# Patient Record
Sex: MALE | URBAN - METROPOLITAN AREA
[De-identification: names, ages, dates, MRNs, and addresses within clinical notes are randomized per-mention and may not be internally consistent; named-entity substitution may affect disease eponyms.]

---

## 2017-10-23 ENCOUNTER — APPOINTMENT (RX ONLY)
Dept: URBAN - METROPOLITAN AREA CLINIC 348 | Facility: CLINIC | Age: 22
Setting detail: DERMATOLOGY
End: 2017-10-23

## 2017-10-23 DIAGNOSIS — L74.51 PRIMARY FOCAL HYPERHIDROSIS: ICD-10-CM | Status: STABLE

## 2017-10-23 PROBLEM — L74.513 PRIMARY FOCAL HYPERHIDROSIS, SOLES: Status: ACTIVE | Noted: 2017-10-23

## 2017-10-23 PROBLEM — L74.512 PRIMARY FOCAL HYPERHIDROSIS, PALMS: Status: ACTIVE | Noted: 2017-10-23

## 2017-10-23 PROBLEM — L74.510 PRIMARY FOCAL HYPERHIDROSIS, AXILLA: Status: ACTIVE | Noted: 2017-10-23

## 2017-10-23 PROCEDURE — 99213 OFFICE O/P EST LOW 20 MIN: CPT

## 2017-10-23 PROCEDURE — ? TREATMENT REGIMEN

## 2017-10-23 PROCEDURE — ? PRESCRIPTION

## 2017-10-23 PROCEDURE — ? COUNSELING

## 2017-10-23 RX ORDER — GLYCOPYRROLATE 1 MG/1
TABLET ORAL
Qty: 30 | Refills: 3 | Status: ERX

## 2017-10-23 RX ORDER — ALUMINUM CHLORIDE 20 %
SOLUTION, NON-ORAL TOPICAL
Qty: 1 | Refills: 3 | Status: ERX

## 2017-10-23 RX ORDER — GLYCOPYRROLATE 1 MG/1
TABLET ORAL
Qty: 30 | Refills: 2 | Status: CANCELLED
Stop reason: CLARIF

## 2017-10-23 ASSESSMENT — LOCATION DETAILED DESCRIPTION DERM
LOCATION DETAILED: LEFT RADIAL PALM
LOCATION DETAILED: RIGHT AXILLARY VAULT
LOCATION DETAILED: RIGHT MEDIAL PLANTAR MIDFOOT
LOCATION DETAILED: LEFT LATERAL PLANTAR MIDFOOT
LOCATION DETAILED: LEFT AXILLARY VAULT
LOCATION DETAILED: RIGHT RADIAL PALM

## 2017-10-23 ASSESSMENT — LOCATION ZONE DERM
LOCATION ZONE: HAND
LOCATION ZONE: FEET
LOCATION ZONE: AXILLAE

## 2017-10-23 ASSESSMENT — LOCATION SIMPLE DESCRIPTION DERM
LOCATION SIMPLE: RIGHT AXILLARY VAULT
LOCATION SIMPLE: LEFT PLANTAR SURFACE
LOCATION SIMPLE: RIGHT HAND
LOCATION SIMPLE: LEFT AXILLARY VAULT
LOCATION SIMPLE: RIGHT PLANTAR SURFACE
LOCATION SIMPLE: LEFT HAND

## 2017-10-23 NOTE — PROCEDURE: TREATMENT REGIMEN
Detail Level: Detailed
Continue Regimen: Hypercare- apply to affected areas every night\\nRobinul- take 1 tab every day

## 2019-09-28 ENCOUNTER — APPOINTMENT (OUTPATIENT)
Dept: GENERAL RADIOLOGY | Age: 24
End: 2019-09-28
Attending: EMERGENCY MEDICINE
Payer: COMMERCIAL

## 2019-09-28 ENCOUNTER — HOSPITAL ENCOUNTER (EMERGENCY)
Age: 24
Discharge: HOME | End: 2019-09-29
Attending: EMERGENCY MEDICINE | Admitting: EMERGENCY MEDICINE
Payer: COMMERCIAL

## 2019-09-28 DIAGNOSIS — T78.05XA: Primary | ICD-10-CM

## 2019-09-28 LAB
COMMENT, HOLDF: NORMAL
SAMPLES BEING HELD,HOLD: NORMAL

## 2019-09-28 PROCEDURE — 74011250636 HC RX REV CODE- 250/636: Performed by: EMERGENCY MEDICINE

## 2019-09-28 PROCEDURE — 94640 AIRWAY INHALATION TREATMENT: CPT

## 2019-09-28 PROCEDURE — 96374 THER/PROPH/DIAG INJ IV PUSH: CPT

## 2019-09-28 PROCEDURE — 99285 EMERGENCY DEPT VISIT HI MDM: CPT

## 2019-09-28 PROCEDURE — 74011000250 HC RX REV CODE- 250: Performed by: EMERGENCY MEDICINE

## 2019-09-28 PROCEDURE — 96375 TX/PRO/DX INJ NEW DRUG ADDON: CPT

## 2019-09-28 PROCEDURE — 71046 X-RAY EXAM CHEST 2 VIEWS: CPT

## 2019-09-28 RX ORDER — ALBUTEROL SULFATE 90 UG/1
AEROSOL, METERED RESPIRATORY (INHALATION)
COMMUNITY

## 2019-09-28 RX ADMIN — FAMOTIDINE 20 MG: 10 INJECTION, SOLUTION INTRAVENOUS at 23:45

## 2019-09-28 RX ADMIN — METHYLPREDNISOLONE SODIUM SUCCINATE 125 MG: 125 INJECTION, POWDER, FOR SOLUTION INTRAMUSCULAR; INTRAVENOUS at 23:43

## 2019-09-28 RX ADMIN — ALBUTEROL SULFATE 1 DOSE: 2.5 SOLUTION RESPIRATORY (INHALATION) at 23:37

## 2019-09-29 VITALS
HEIGHT: 70 IN | DIASTOLIC BLOOD PRESSURE: 76 MMHG | OXYGEN SATURATION: 97 % | RESPIRATION RATE: 16 BRPM | BODY MASS INDEX: 29.89 KG/M2 | WEIGHT: 208.78 LBS | SYSTOLIC BLOOD PRESSURE: 120 MMHG | HEART RATE: 80 BPM | TEMPERATURE: 98 F

## 2019-09-29 RX ORDER — EPINEPHRINE 0.3 MG/.3ML
0.3 INJECTION SUBCUTANEOUS
Qty: 1 SYRINGE | Refills: 0 | Status: SHIPPED | OUTPATIENT
Start: 2019-09-29 | End: 2019-09-29

## 2019-09-29 NOTE — ED PROVIDER NOTES
21 y.o. male with past medical history significant for asthma who presents from visiting residence via EMS for evaluation s/p allergic reaction. Pt reports an allergy to peanuts. At dinner this evening he consumed [employer] of which he suspects had pecans in the crust. Pt states that his lips began to swell and mouth became \"tingly\" at dinner. He took 50mg of Benadryl. Approximately 10-15 minutes later, pt developed difficulty breathing. EMS contacted. Pt was given 0.3mg Epi, Benadryl and breathing treatment. While in ED, pt notes moderate relief. He denies any swelling to his tongue. No nausea, vomiting, abdominal pain, sore throat or rash. Difficulty breathing has also improved. There are no other acute medical concerns at this time. PCP: Other, MD Chelsy    Note written by Karen Gibson, as dictated by Nato Anaya DO 11:23 PM             Past Medical History:   Diagnosis Date    Asthma        History reviewed. No pertinent surgical history. History reviewed. No pertinent family history. Social History     Socioeconomic History    Marital status: SINGLE     Spouse name: Not on file    Number of children: Not on file    Years of education: Not on file    Highest education level: Not on file   Occupational History    Not on file   Social Needs    Financial resource strain: Not on file    Food insecurity:     Worry: Not on file     Inability: Not on file    Transportation needs:     Medical: Not on file     Non-medical: Not on file   Tobacco Use    Smoking status: Never Smoker    Smokeless tobacco: Never Used   Substance and Sexual Activity    Alcohol use:  Yes     Alcohol/week: 3.0 standard drinks     Types: 3 Cans of beer per week    Drug use: Yes     Frequency: 3.0 times per week     Types: Marijuana     Comment: smoke and edibles    Sexual activity: Not on file   Lifestyle    Physical activity:     Days per week: Not on file     Minutes per session: Not on file    Stress: Not on file   Relationships    Social connections:     Talks on phone: Not on file     Gets together: Not on file     Attends Rastafarian service: Not on file     Active member of club or organization: Not on file     Attends meetings of clubs or organizations: Not on file     Relationship status: Not on file    Intimate partner violence:     Fear of current or ex partner: Not on file     Emotionally abused: Not on file     Physically abused: Not on file     Forced sexual activity: Not on file   Other Topics Concern    Not on file   Social History Narrative    Not on file         ALLERGIES: Peanut; Pecan nut; and Penicillins    Review of Systems   Constitutional: Negative for activity change, appetite change, chills and fever. HENT: Positive for facial swelling. Negative for congestion, rhinorrhea, sinus pain, sneezing and sore throat. Eyes: Negative for photophobia and visual disturbance. Respiratory: Negative for cough and shortness of breath. Cardiovascular: Negative for chest pain. Gastrointestinal: Negative for abdominal pain, blood in stool, constipation, diarrhea, nausea and vomiting. Genitourinary: Negative for difficulty urinating, dysuria, flank pain, hematuria, penile pain and testicular pain. Musculoskeletal: Negative for arthralgias, back pain, myalgias and neck pain. Skin: Negative for rash and wound. Neurological: Negative for syncope, weakness, light-headedness, numbness and headaches. Psychiatric/Behavioral: Negative for self-injury and suicidal ideas. All other systems reviewed and are negative. Vitals:    09/28/19 2247 09/28/19 2257 09/28/19 2340   BP: 142/79     Pulse: 94     Resp: 14     Temp: 97.5 °F (36.4 °C)     SpO2: 96% 92% 95%   Weight: 94.7 kg (208 lb 12.4 oz)     Height: 5' 10\" (1.778 m)              Physical Exam   Constitutional: He is oriented to person, place, and time. He appears well-developed and well-nourished.    Pleasant, No acute distress, speaking in full sentences   HENT:   Head: Normocephalic and atraumatic. Mouth/Throat: Posterior oropharyngeal erythema (without significant swelling) present. mallampati 1   Eyes: Pupils are equal, round, and reactive to light. Conjunctivae and EOM are normal.   Neck: Neck supple. No cervical adenopathy  Full ROM of neck   Cardiovascular: Regular rhythm and normal heart sounds. Tachycardia present. Pulmonary/Chest: Effort normal. He has decreased breath sounds (slightly diminished at the bases). He has wheezes (very faint scattered wheezes). Abdominal: Soft. He exhibits no distension. There is no tenderness. Musculoskeletal: He exhibits no edema or tenderness. Neurological: He is alert and oriented to person, place, and time. Skin: Skin is warm and dry. No rash noted. He is not diaphoretic. Nursing note and vitals reviewed. Note written by Karen Gardiner, as dictated by Romie Garcia DO 11:47 PM     MDM   70-year-old male with history of anaphylactic reaction to peanuts presents to the emergency department after he had anaphylaxis from [facility] prior to arrival.  He was given 0.3 mg of epi and Benadryl prior to arrival.      On exam found to have scattered faint wheezes and diminished breath sounds, given a DuoNeb in the ED and had significant improvement in his symptoms. He was also given a dose of IV Pepcid and Solu-Medrol and was observed for 3 hours while in the ED monitoring for any return of anaphylactic symptoms. Chest x-ray was viewed by myself and read by radiology showing possible tracheal narrowing above the thoracic inlet but no other acute process. Patient has no stridor or further respiratory distress on exam after DuoNeb. Patient remained significantly improved after this 3 hour stay. He was prescribed an EpiPen and recommended continued use of his albuterol inhaler as needed every 4-6 hours.   Recommended PCP follow-up, as needed, and return precautions were given for worsening or concerns.     Total critical care time spent exclusive of procedures:  35 minutes    Procedures

## 2019-09-29 NOTE — ED TRIAGE NOTES
Pt arrives via EMS from friend's house, visiting from [place]. Pt went to a restaurant for dinner, unaware a dish had pecans in it where his lips began to swell. He went back to friend's house, took 50 mg benadryl without relief. EMS reports pt was 89% on RA with increased WOB, tight lung sounds, and red skin tone. EMS administered 0.3 IM epi, 50 mg benadryl, and a breathing treatment that is finishing upon arrival to ED. Pt reports feeling better, VSS. Pt arrives A&Ox4, speaking in complete sentences.  92% on RA, placed on 2 L NC for comfort, glendy to 95%

## 2019-09-29 NOTE — ED NOTES
I have reviewed discharge instructions with the patient. The patient verbalized understanding. VSS, respirations even and unlabored and in no acute distress. Ambulated  Out of the department with a steady gait.